# Patient Record
Sex: FEMALE | Race: WHITE
[De-identification: names, ages, dates, MRNs, and addresses within clinical notes are randomized per-mention and may not be internally consistent; named-entity substitution may affect disease eponyms.]

---

## 2021-02-17 ENCOUNTER — HOSPITAL ENCOUNTER (EMERGENCY)
Dept: HOSPITAL 47 - EC | Age: 26
Discharge: HOME | End: 2021-02-17
Payer: COMMERCIAL

## 2021-02-17 VITALS — HEART RATE: 93 BPM | DIASTOLIC BLOOD PRESSURE: 76 MMHG | SYSTOLIC BLOOD PRESSURE: 114 MMHG

## 2021-02-17 VITALS — RESPIRATION RATE: 16 BRPM | TEMPERATURE: 99.1 F

## 2021-02-17 DIAGNOSIS — Z88.0: ICD-10-CM

## 2021-02-17 DIAGNOSIS — Z88.8: ICD-10-CM

## 2021-02-17 DIAGNOSIS — T58.91XA: Primary | ICD-10-CM

## 2021-02-17 PROCEDURE — 82375 ASSAY CARBOXYHB QUANT: CPT

## 2021-02-17 PROCEDURE — 81025 URINE PREGNANCY TEST: CPT

## 2021-02-17 PROCEDURE — 99284 EMERGENCY DEPT VISIT MOD MDM: CPT

## 2021-02-17 NOTE — ED
General Adult HPI





- General


Chief complaint: Dizziness


Stated complaint: co2 poisoning


Time Seen by Provider: 02/17/21 16:57


Source: patient, EMS


Mode of arrival: EMS


Limitations: no limitations





- History of Present Illness


Initial comments: 


Tru a 25-year-old female who presents to the ER today by ambulance for 

evaluation of lightheadedness and concern for exposure to carbon monoxide.  

Patient reports that approximately half an hour before calling 911 she started 

her stove to cut she began to smell something atypical would lightheaded, she 

became concerned she may be exposed to carbon monoxide. EMS and fire arrived on 

scene, she was waiting outdoors on the porch,fire measured the carbon monoxide 

level in her residence as 160 and rising, the patient was awake alert and 

oriented and elected to be transported to the hospital for further evaluation 

per fire's recommendation.








- Related Data


                                    Allergies











Allergy/AdvReac Type Severity Reaction Status Date / Time


 


lamotrigine [From Lamictal] Allergy  Anaphylaxis Verified 02/17/21 17:06


 


Penicillins AdvReac  Nausea & Verified 02/17/21 17:06





   Vomiting  


 


prednisone AdvReac  Unknown Verified 02/17/21 17:06














Review of Systems


ROS Statement: 


Those systems with pertinent positive or pertinent negative responses have been 

documented in the HPI.





ROS Other: All systems not noted in ROS Statement are negative.





Past Medical History


Additional Past Medical History / Comment(s): syncope, gastroparesis


History of Any Multi-Drug Resistant Organisms: None Reported


Past Surgical History: Cholecystectomy


Past Psychological History: Anxiety, Depression


Past Alcohol Use History: Rare


Past Drug Use History: None Reported





General Exam





- General Exam Comments


Initial Comments: 


Physical Exam


GENERAL:


Patient is well-developed and well-nourished.  


Patient is nontoxic and well-hydrated and is in no distress.





HENT:


Normocephalic, Atraumatic. 





EYES:


PERRL, EOMI





PULMONARY:


Unlabored respirations.  





CARDIOVASCULAR:


RRR


Warm and well perfused extremities





ABDOMEN:


Non-distended





SKIN:


No rashes or bruising 





: 


Deferred





NEUROLOGIC:


Alert and oriented


Normal speech


Normal gait





MUSCULOSKELETAL:


Moving all extremities with no apparent injury 





PSYCHIATRIC:


No SI/HI


Limitations: no limitations





Course


                                   Vital Signs











  02/17/21





  17:01


 


Temperature 99.1 F


 


Pulse Rate 102 H


 


Respiratory 16





Rate 


 


O2 Sat by Pulse 97





Oximetry 














Medical Decision Making





- Medical Decision Making


patient was seen and evaluated history is obtained from the patient and EMS


Patient reports she's began to feel lightheaded at home when exposed to carbon 

monoxide however is feeling better after receiving oxygen in route to the 

hospital


Carbon monoxide level 2.6


Patient received one hour of supplemental oxygen she remains asymptomatic and is

comfortable with plan for discharge home











- Lab Data


                                   Lab Results











  02/17/21 02/17/21 Range/Units





  17:03 17:03 


 


Carbon Monoxide, Quant  2.6   (<10.0)  %


 


Urine HCG, Qual   Not Detected  (Not Detectd)  














Disposition


Clinical Impression: 


 Carbon monoxide exposure





Disposition: HOME SELF-CARE


Condition: Stable


Instructions (If sedation given, give patient instructions):  Carbon Monoxide 

Poisoning (ED)


Is patient prescribed a controlled substance at d/c from ED?: No


Referrals: 


Saray Ponce MD [Primary Care Provider] - 1-2 days

## 2021-03-02 ENCOUNTER — HOSPITAL ENCOUNTER (EMERGENCY)
Dept: HOSPITAL 47 - EC | Age: 26
Discharge: HOME | End: 2021-03-02
Payer: COMMERCIAL

## 2021-03-02 VITALS — RESPIRATION RATE: 18 BRPM | DIASTOLIC BLOOD PRESSURE: 78 MMHG | HEART RATE: 90 BPM | SYSTOLIC BLOOD PRESSURE: 125 MMHG

## 2021-03-02 VITALS — TEMPERATURE: 98.6 F

## 2021-03-02 DIAGNOSIS — Z90.49: ICD-10-CM

## 2021-03-02 DIAGNOSIS — F32.9: ICD-10-CM

## 2021-03-02 DIAGNOSIS — N94.10: Primary | ICD-10-CM

## 2021-03-02 LAB
PH UR: 6 [PH] (ref 5–8)
SP GR UR: 1.02 (ref 1–1.03)
SQUAMOUS UR QL AUTO: 4 /HPF (ref 0–4)
UROBILINOGEN UR QL STRIP: <2 MG/DL (ref ?–2)
WBC # UR AUTO: 5 /HPF (ref 0–5)

## 2021-03-02 PROCEDURE — 87070 CULTURE OTHR SPECIMN AEROBIC: CPT

## 2021-03-02 PROCEDURE — 87591 N.GONORRHOEAE DNA AMP PROB: CPT

## 2021-03-02 PROCEDURE — 87491 CHLMYD TRACH DNA AMP PROBE: CPT

## 2021-03-02 PROCEDURE — 81001 URINALYSIS AUTO W/SCOPE: CPT

## 2021-03-02 PROCEDURE — 99283 EMERGENCY DEPT VISIT LOW MDM: CPT

## 2021-03-02 PROCEDURE — 81025 URINE PREGNANCY TEST: CPT

## 2021-03-02 PROCEDURE — 87808 TRICHOMONAS ASSAY W/OPTIC: CPT

## 2021-03-02 NOTE — ED
General Adult HPI





- General


Chief complaint: Vaginal Bleeding


Stated complaint: Vaginal pain


Time Seen by Provider: 03/02/21 14:09


Source: patient, RN notes reviewed


Mode of arrival: ambulatory


Limitations: no limitations





- History of Present Illness


Initial comments: 





25-year-old female with a past medical history of syncope, gastroparesis, 

borderline personality disorder presents to the emergency room for a chief 

complaint of vaginal pain.  Patient states that she had sexual intercourse with 

a new partner 2 weeks ago.  States that for the past week she has had pain in 

the vaginal canal.  States that it feels like razor blades.  States that it 

hurts worse when she urinates.  Patient denies any pubic or abdominal pain.  

Denies fevers or chills.  Patient states she does not have any concern for STDs.

 She denies any vaginal discharge or foul odor.patient states she has had this 

pain several times before the last for a few days after sexual usually resolves.

 This time is different because it is not resolving.  patient also concerned she

could be pregnant.  States she has an IUD and has not had a period for 2 years 

however she had a drain she was pregnant and her dreams are usually accurate.  

Patient has no other complaints at this time including shortness of breath, 

chest pain, abdominal pain, nausea or vomiting, headache, or visual changes.





- Related Data


                                    Allergies











Allergy/AdvReac Type Severity Reaction Status Date / Time


 


lamotrigine [From Lamictal] Allergy  Anaphylaxis Verified 03/02/21 14:09


 


Penicillins AdvReac  Nausea & Verified 03/02/21 14:09





   Vomiting  


 


prednisone AdvReac  Unknown Verified 03/02/21 14:09














Review of Systems


ROS Statement: 


Those systems with pertinent positive or pertinent negative responses have been 

documented in the HPI.





ROS Other: All systems not noted in ROS Statement are negative.





Past Medical History


Past Medical History: Syncope


Additional Past Medical History / Comment(s): gastroparesis, borderline 

personality disorder


History of Any Multi-Drug Resistant Organisms: None Reported


Past Surgical History: Cholecystectomy


Past Psychological History: Anxiety, Depression


Smoking Status: Never smoker


Past Alcohol Use History: Rare


Past Drug Use History: None Reported





General Exam


Limitations: no limitations


General appearance: alert, in no apparent distress


Head exam: Present: atraumatic, normocephalic, normal inspection


Eye exam: Present: normal appearance, PERRL, EOMI.  Absent: scleral icterus, 

conjunctival injection, periorbital swelling


ENT exam: Present: normal exam, mucous membranes moist


Neck exam: Present: normal inspection, full ROM


Respiratory exam: Present: normal lung sounds bilaterally.  Absent: respiratory 

distress, wheezes, rales, rhonchi, stridor


Cardiovascular Exam: Present: regular rate, normal rhythm, normal heart sounds. 

Absent: systolic murmur, diastolic murmur, rubs, gallop, clicks


GI/Abdominal exam: Present: soft, normal bowel sounds.  Absent: distended, 

tenderness, guarding, rebound, rigid


External exam: Present: normal external exam.  Absent: erythema, swelling, 

lesions, lacerations, ecchymosis


Speculum exam: Present: cervical discharge (minimal mucousy cervical  

discharge), other (IUD strings in place, mild pain with speculum insertion).  

Absent: erythema, vaginal discharge, vaginal bleeding, foreign body, tissue, 

laceration


By manual exam: Present: normal by manual exam.  Absent: cervical motion 

tenderness, adnexal tenderness, adnexal mass, uterine enlargement, uterine 

tenderness





Course


                                   Vital Signs











  03/02/21





  14:03


 


Temperature 98.6 F


 


Pulse Rate 103 H


 


Respiratory 20





Rate 


 


Blood Pressure 133/81


 


O2 Sat by Pulse 96





Oximetry 














Medical Decision Making





- Medical Decision Making





Vitals are stable.  HPI and physical exam as documented. Urinalysis 

unremarkable.  HCG is negative.  Trichomonas negative.  Patient does not want 

empiric treatment for STDs at this time because she is not concerned about she 

could have these.  I did discuss that we will not get results back for at least 

2 days which she prefers to wait.  At this time we will wait for culture results

and have her follow up with OB/GYN.  If symptoms worsen she will return to the 

emergency room for further evaluation.





- Lab Data


                                   Lab Results











  03/02/21 03/02/21 03/02/21 Range/Units





  14:46 14:46 14:48 


 


Urine Color  Yellow    


 


Urine Appearance  Cloudy H    (Clear)  


 


Urine pH  6.0    (5.0-8.0)  


 


Ur Specific Gravity  1.024    (1.001-1.035)  


 


Urine Protein  Negative    (Negative)  


 


Urine Glucose (UA)  Negative    (Negative)  


 


Urine Ketones  Negative    (Negative)  


 


Urine Blood  Negative    (Negative)  


 


Urine Nitrite  Negative    (Negative)  


 


Urine Bilirubin  Negative    (Negative)  


 


Urine Urobilinogen  <2.0    (<2.0)  mg/dL


 


Ur Leukocyte Esterase  Small H    (Negative)  


 


Urine WBC  5    (0-5)  /hpf


 


Ur Squamous Epith Cells  4    (0-4)  /hpf


 


Urine Bacteria  Occasional H    (None)  /hpf


 


Urine Mucus  Occasional H    (None)  /hpf


 


Urine HCG, Qual   Not Detected   (Not Detectd)  


 


Trichomonas Ag (Rapid)    Negative  (Negative)  














Disposition


Clinical Impression: 


 Dyspareunia





Disposition: HOME SELF-CARE


Condition: Good


Instructions (If sedation given, give patient instructions):  Pelvic Pain in 

Women (ED)


Additional Instructions: 


Please follow up on culture results.  If symptoms worsen, you develop fevers, or

any other concerns return immediately to the emergency room.  Otherwise try to 

follow up with OB/GYN.


Is patient prescribed a controlled substance at d/c from ED?: No


Referrals: 


Saray Ponce MD [Primary Care Provider] - 1-2 days


Time of Disposition: 15:53

## 2021-03-09 ENCOUNTER — HOSPITAL ENCOUNTER (EMERGENCY)
Dept: HOSPITAL 47 - EC | Age: 26
Discharge: HOME | End: 2021-03-09
Payer: COMMERCIAL

## 2021-03-09 VITALS
HEART RATE: 98 BPM | RESPIRATION RATE: 18 BRPM | TEMPERATURE: 98.2 F | SYSTOLIC BLOOD PRESSURE: 128 MMHG | DIASTOLIC BLOOD PRESSURE: 80 MMHG

## 2021-03-09 DIAGNOSIS — R55: ICD-10-CM
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
## 2021-03-09 DIAGNOSIS — Z90.49: ICD-10-CM
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
## 2021-03-09 DIAGNOSIS — M54.5: Primary | ICD-10-CM

## 2021-03-09 DIAGNOSIS — F32.9: ICD-10-CM

## 2021-03-09 PROCEDURE — 99284 EMERGENCY DEPT VISIT MOD MDM: CPT

## 2021-03-09 PROCEDURE — 71046 X-RAY EXAM CHEST 2 VIEWS: CPT

## 2021-03-09 PROCEDURE — 72100 X-RAY EXAM L-S SPINE 2/3 VWS: CPT

## 2021-03-09 PROCEDURE — 81025 URINE PREGNANCY TEST: CPT

## 2021-03-09 PROCEDURE — 93005 ELECTROCARDIOGRAM TRACING: CPT

## 2021-03-09 NOTE — XR
EXAMINATION TYPE: XR lumbar spine 2 or 3V

 

DATE OF EXAM: 3/9/2021

 

CLINICAL HISTORY: Fall injury with pain

 

TECHNIQUE: Frontal and lateral images of the lumbar spine are obtained.

 

COMPARISON: None

 

FINDINGS:  There are 5 lumbar type vertebral bodies identified.  The lumbar spine shows satisfactory 
alignment without evidence of acute fracture or dislocation. Vertebral body heights and disk space he
ights are within normal limits.  Metallic IUD partially imaged in the pelvis.

 

IMPRESSION:  No acute fracture or dislocation is seen in the lumbar spine.

## 2021-03-09 NOTE — XR
EXAMINATION TYPE: XR chest 2V

 

DATE OF EXAM: 3/9/2021

 

COMPARISON: NONE

 

HISTORY: Syncope and weakness.

 

TECHNIQUE:  Frontal and lateral views of the chest are obtained.

 

FINDINGS: Somewhat low lung volumes. There is no focal air space opacity, pleural effusion, or pneumo
thorax seen.  The cardiac silhouette size is within normal limits.   The osseous structures are intac
t. Cholecystectomy clips noted on lateral view.

 

IMPRESSION:  No acute cardiopulmonary process.

## 2021-03-09 NOTE — ED
Back Pain HPI





- General


Chief Complaint: Back Pain/Injury


Stated Complaint: Lower back pain


Time Seen by Provider: 03/09/21 16:16


Source: patient


Limitations: no limitations





- History of Present Illness


Initial Comments: 


24yo female presenting today for cc of fall in shower. patient states she 

believes she passed otu in the shouwer she states this has happened for years 

now and she states she has had "every test" and has been seen by cardiology. 

Patient states that she did not come in today for the syncopal episode. She 

states she remembers falling to her butt, denies head injury, headaches, 

dizziness, nausea, vomiting. She denies chest pain dyspnea now, during or prior 

to episode. Patient denies neck pain. She admits to low bck pain, denies 

extremity pain/injury> Denies lacerations/abrasion. Patient denies additional 

complaints.patient denies IV drug use, fevers, loss of bowel bladder control, 

urinary retention, weakness or sensation deficits of the lower extremity.  

Patient denies radiation of the pain down the legs. UPon arrival she appears 

well nontoxic in no acute distress. denies pregnancy.








- Related Data


                                    Allergies











Allergy/AdvReac Type Severity Reaction Status Date / Time


 


lamotrigine [From Lamictal] Allergy  Anaphylaxis Verified 03/09/21 16:00


 


Penicillins AdvReac  Nausea & Verified 03/09/21 16:00





   Vomiting  


 


prednisone AdvReac  Unknown Verified 03/09/21 16:00














Review of Systems


ROS Statement: 


Those systems with pertinent positive or pertinent negative responses have been 

documented in the HPI.





ROS Other: All systems not noted in ROS Statement are negative.





Past Medical History


Past Medical History: Syncope


Additional Past Medical History / Comment(s): gastroparesis, borderline 

personality disorder,


History of Any Multi-Drug Resistant Organisms: None Reported


Past Surgical History: Cholecystectomy


Past Psychological History: Anxiety, Depression


Smoking Status: Never smoker


Past Alcohol Use History: Rare


Past Drug Use History: None Reported





General Exam





- General Exam Comments


Initial Comments: 


General:  The patient is awake and alert, in no distress


Eye:  +3 mm pupils are equal, round and reactive to light, extra-ocular 

movements are intact.  No nystagmus.  There is normal conjunctiva bilaterally.  

No signs of icterus.  


Ears, nose, mouth and throat:  There are moist mucous membranes and no oral 

lesions. 


Neck:  The neck is supple, there is no tenderness or JVD.  


Cardiovascular:  There is a regular rate and rhythm. No murmur, rub or gallop is

appreciated.


Respiratory:  Lungs are clear to auscultation, respirations are non-labored, 

breath sounds are equal.  No wheezes, stridor, rales, or rhonchi.


Gastrointestinal:  Soft, non-distended, non-tender abdomen without masses or 

organomegaly noted. There is no rebound or guarding present. 


Musculoskeletal: inspection of the cervical thoracic and lumbar spine.  No 

cervical or thoracic tenderness patient has right-sided paraspinal tenderness no

midline tenderness.  Negative straight leg raise.  Full strength of the upper or

lower extremities equal comparison bilaterally.  Sensation including the saddle 

region of the lower extremity bilaterally.  She can heel and toe walk.  No mild 

clonus or fasciculations. Radial and DP pulses equal bilaterally 2+.  


Neurological:  A&O x 3. CN II-XII intact grossly, There are no obvious motor or 

sensory deficits. Coordination appears grossly intact. Speech is normal.


Skin:  Skin is warm and dry and no rashes or lesions are noted. 


Psychiatric:  Cooperative, appropriate mood & affect, normal judgment.  





Limitations: no limitations





Course


                                   Vital Signs











  03/09/21





  15:58


 


Temperature 98.2 F


 


Pulse Rate 98


 


Respiratory 18





Rate 


 


Blood Pressure 128/80


 


O2 Sat by Pulse 95





Oximetry 














Medical Decision Making





- Medical Decision Making


25-year-old female presenting today for chief complaint of low back pain after 

fall.  Patient does have history of syncope.  She states that she's been 

evaluated thoroughly outpatient.  Patient states that was not her concern today 

is this is not unusual she denies any chest pain shortness of breath.  Patient 

states she no should not hit her head.  Patient's EKG no specific findings.  

Patient chest x-ray clear.no murmur no extremity findings.  Patient is 

paraspinal tenderness.  X-rays of lumbar spine are negative for acute osseous 

process patient has noticed findings concerning for vascular compromise.  

Patient has no neurological deficits.  No findings concerning for cauda equina 

at this time feel she is stable for discharge with outpatient primary care 

follow-up and symptomatically treatment





I discussed the case with Dr. Romo was agreeable to this care plan discharge at

this time








- Lab Data


                                   Lab Results











  03/09/21 Range/Units





  17:24 


 


Urine HCG, Qual  Not Detected  (Not Detectd)  














Disposition


Clinical Impression: 


 Low back pain, Fall, Syncope





Disposition: HOME SELF-CARE


Condition: Good


Instructions (If sedation given, give patient instructions):  Acute Low Back 

Pain (ED)


Additional Instructions: 


Please use medication as discussed.  Please follow-up with family doctor in the 

next 2 days.  Please return to emergency room if the symptoms increase or worsen

or for any other concerns.


Is patient prescribed a controlled substance at d/c from ED?: No


Referrals: 


Saray Ponce MD [Primary Care Provider] - 1-2 days


Time of Disposition: 17:40

## 2021-03-10 ENCOUNTER — HOSPITAL ENCOUNTER (EMERGENCY)
Dept: HOSPITAL 47 - EC | Age: 26
Discharge: HOME | End: 2021-03-10
Payer: COMMERCIAL

## 2021-03-10 VITALS — SYSTOLIC BLOOD PRESSURE: 122 MMHG | DIASTOLIC BLOOD PRESSURE: 78 MMHG | TEMPERATURE: 98.2 F | HEART RATE: 77 BPM

## 2021-03-10 VITALS — RESPIRATION RATE: 18 BRPM

## 2021-03-10 DIAGNOSIS — Z90.49: ICD-10-CM

## 2021-03-10 DIAGNOSIS — M25.512: ICD-10-CM

## 2021-03-10 DIAGNOSIS — R55: Primary | ICD-10-CM

## 2021-03-10 DIAGNOSIS — Z88.0: ICD-10-CM

## 2021-03-10 DIAGNOSIS — Z88.8: ICD-10-CM

## 2021-03-10 LAB
ALBUMIN SERPL-MCNC: 3.8 G/DL (ref 3.5–5)
ALP SERPL-CCNC: 99 U/L (ref 38–126)
ALT SERPL-CCNC: 27 U/L (ref 4–34)
ANION GAP SERPL CALC-SCNC: 7 MMOL/L
APTT BLD: 23.9 SEC (ref 22–30)
AST SERPL-CCNC: 29 U/L (ref 14–36)
BASOPHILS # BLD AUTO: 0.1 K/UL (ref 0–0.2)
BASOPHILS NFR BLD AUTO: 1 %
BUN SERPL-SCNC: 15 MG/DL (ref 7–17)
CALCIUM SPEC-MCNC: 9.2 MG/DL (ref 8.4–10.2)
CHLORIDE SERPL-SCNC: 106 MMOL/L (ref 98–107)
CO2 SERPL-SCNC: 26 MMOL/L (ref 22–30)
EOSINOPHIL # BLD AUTO: 0.4 K/UL (ref 0–0.7)
EOSINOPHIL NFR BLD AUTO: 5 %
ERYTHROCYTE [DISTWIDTH] IN BLOOD BY AUTOMATED COUNT: 4.53 M/UL (ref 3.8–5.4)
ERYTHROCYTE [DISTWIDTH] IN BLOOD: 12.9 % (ref 11.5–15.5)
GLUCOSE SERPL-MCNC: 91 MG/DL (ref 74–99)
HCT VFR BLD AUTO: 37.7 % (ref 34–46)
HGB BLD-MCNC: 13.1 GM/DL (ref 11.4–16)
INR PPP: 1 (ref ?–1.2)
LYMPHOCYTES # SPEC AUTO: 2 K/UL (ref 1–4.8)
LYMPHOCYTES NFR SPEC AUTO: 28 %
MCH RBC QN AUTO: 28.9 PG (ref 25–35)
MCHC RBC AUTO-ENTMCNC: 34.7 G/DL (ref 31–37)
MCV RBC AUTO: 83.3 FL (ref 80–100)
MONOCYTES # BLD AUTO: 0.3 K/UL (ref 0–1)
MONOCYTES NFR BLD AUTO: 5 %
NEUTROPHILS # BLD AUTO: 4.3 K/UL (ref 1.3–7.7)
NEUTROPHILS NFR BLD AUTO: 60 %
PH UR: 6 [PH] (ref 5–8)
PLATELET # BLD AUTO: 271 K/UL (ref 150–450)
POTASSIUM SERPL-SCNC: 3.9 MMOL/L (ref 3.5–5.1)
PROT SERPL-MCNC: 6.6 G/DL (ref 6.3–8.2)
PT BLD: 10.3 SEC (ref 9–12)
RBC UR QL: 1 /HPF (ref 0–5)
SODIUM SERPL-SCNC: 139 MMOL/L (ref 137–145)
SP GR UR: 1.03 (ref 1–1.03)
SQUAMOUS UR QL AUTO: 3 /HPF (ref 0–4)
UROBILINOGEN UR QL STRIP: <2 MG/DL (ref ?–2)
WBC # BLD AUTO: 7.2 K/UL (ref 3.8–10.6)
WBC #/AREA URNS HPF: 2 /HPF (ref 0–5)

## 2021-03-10 PROCEDURE — 84484 ASSAY OF TROPONIN QUANT: CPT

## 2021-03-10 PROCEDURE — 96360 HYDRATION IV INFUSION INIT: CPT

## 2021-03-10 PROCEDURE — 80053 COMPREHEN METABOLIC PANEL: CPT

## 2021-03-10 PROCEDURE — 85610 PROTHROMBIN TIME: CPT

## 2021-03-10 PROCEDURE — 85730 THROMBOPLASTIN TIME PARTIAL: CPT

## 2021-03-10 PROCEDURE — 85025 COMPLETE CBC W/AUTO DIFF WBC: CPT

## 2021-03-10 PROCEDURE — 99284 EMERGENCY DEPT VISIT MOD MDM: CPT

## 2021-03-10 PROCEDURE — 36415 COLL VENOUS BLD VENIPUNCTURE: CPT

## 2021-03-10 PROCEDURE — 81025 URINE PREGNANCY TEST: CPT

## 2021-03-10 PROCEDURE — 71046 X-RAY EXAM CHEST 2 VIEWS: CPT

## 2021-03-10 PROCEDURE — 81001 URINALYSIS AUTO W/SCOPE: CPT

## 2021-03-10 PROCEDURE — 93005 ELECTROCARDIOGRAM TRACING: CPT

## 2021-03-10 PROCEDURE — 70450 CT HEAD/BRAIN W/O DYE: CPT

## 2021-03-10 NOTE — XR
EXAMINATION TYPE: XR chest 2V

 

DATE OF EXAM: 3/10/2021

 

COMPARISON: Chest x-ray from yesterday.

 

HISTORY: Syncope and weakness.

 

TECHNIQUE:  Frontal and lateral views of the chest are obtained.

 

FINDINGS: Somewhat low lung volumes are redemonstrated. There is no suspicious new focal air space op
acity, pleural effusion, or pneumothorax seen bilaterally.  The cardiac silhouette size is stable and
 upper limits of normal.   Overlying EKG leads noted on current study. The osseous structures are int
act. Cholecystectomy clips redemonstrated on lateral view.

 

IMPRESSION:  Low lung volumes without acute pulmonary process.

## 2021-03-10 NOTE — ED
General Adult HPI





- General


Chief complaint: Syncope


Stated complaint: Dizziness, Passed out


Time Seen by Provider: 03/10/21 15:14


Source: patient, RN notes reviewed


Mode of arrival: wheelchair


Limitations: no limitations





- History of Present Illness


Initial comments: 





Patient is a 25-year-old female presents to emergency department status post 

syncopal episode.  She noted that she has a history of this for about the past 6

years.  She did note that when she fainted she had a upon arrival hitting her 

right shoulder and her head.  She notes that she does have some shoulder pain 

currently that is moderate.  She she did note that she does follow-up with 

referral to see her primary care regularly.  She states that she only drank 3 

glasses of water this morning.  She was in no apparent distress or pain while 

sitting in exam.  She denied any chest pain shortness of breath headache nausea 

vomiting diarrhea constipation fever fatigue chills weakness numbness tingling 

decreased range of motion or strength in any extremity.





- Related Data


                                    Allergies











Allergy/AdvReac Type Severity Reaction Status Date / Time


 


lamotrigine [From Lamictal] Allergy  Anaphylaxis Verified 03/10/21 14:32


 


Penicillins AdvReac  Nausea & Verified 03/10/21 14:32





   Vomiting  


 


prednisone AdvReac  Unknown Verified 03/10/21 14:32














Review of Systems


ROS Statement: 


Those systems with pertinent positive or pertinent negative responses have been 

documented in the HPI.





ROS Other: All systems not noted in ROS Statement are negative.





Past Medical History


Past Medical History: Syncope


Additional Past Medical History / Comment(s): gastroparesis, borderline 

personality disorder,


History of Any Multi-Drug Resistant Organisms: None Reported


Past Surgical History: Cholecystectomy


Past Psychological History: Anxiety, Depression


Smoking Status: Never smoker


Past Alcohol Use History: Rare


Past Drug Use History: None Reported





General Exam


Limitations: no limitations


General appearance: alert, in no apparent distress, obese


Head exam: Present: atraumatic, normocephalic, normal inspection


Eye exam: Present: normal appearance, PERRL, EOMI.  Absent: scleral icterus, 

conjunctival injection, periorbital swelling


ENT exam: Present: normal exam, mucous membranes moist


Neck exam: Present: normal inspection.  Absent: tenderness, meningismus, 

lymphadenopathy


Respiratory exam: Present: normal lung sounds bilaterally.  Absent: respiratory 

distress, wheezes, rales, rhonchi, stridor


Cardiovascular Exam: Present: regular rate, normal rhythm, normal heart sounds. 

Absent: systolic murmur, diastolic murmur, rubs, gallop, clicks


GI/Abdominal exam: Present: soft, normal bowel sounds.  Absent: distended, 

tenderness, guarding, rebound, rigid


Extremities exam: Present: normal inspection, full ROM, normal capillary refill.

 Absent: tenderness, pedal edema, joint swelling, calf tenderness


Neurological exam: Present: alert, oriented X3, CN II-XII intact


Psychiatric exam: Present: normal affect, normal mood


Skin exam: Present: warm, dry, intact, normal color.  Absent: rash





Course


                                   Vital Signs











  03/10/21 03/10/21





  14:30 14:32


 


Temperature  98.3 F


 


Pulse Rate  114 H


 


Respiratory 16 18





Rate  


 


Blood Pressure  115/77


 


O2 Sat by Pulse  96





Oximetry  














EKG Findings





- EKG Comments:


EKG Findings:: Ventricular rate 102 bpm, CT interval on 148 ms, QRS duration 92 

ms, QT/QTc 348/452 ms, PareT axes 55/18/5.  Sinus tachycardia, nonspecific ST 

abnormality, abnormal ECG.





Medical Decision Making





- Medical Decision Making





25-year-old female status post syncopal episode with injury to her right 

shoulder and hitting her head.


Chest x-ray, right upper shoulder x-ray, CT of the brain, labs, EKG, cardiac 

monitor, orthostatic blood pressure, 1 L normal saline ordered.


Labs unremarkable.


Radiology studies negative for any fracture or abnormalities.


Case discussed with Dr. Millan, decided patient could discharge home with 

conservative management and close follow-up with neurology and primary care.





- Lab Data


Result diagrams: 


                                 03/10/21 15:36





                                 03/10/21 15:36


                                   Lab Results











  03/10/21 03/10/21 03/10/21 Range/Units





  15:36 15:36 15:36 


 


WBC  7.2    (3.8-10.6)  k/uL


 


RBC  4.53    (3.80-5.40)  m/uL


 


Hgb  13.1    (11.4-16.0)  gm/dL


 


Hct  37.7    (34.0-46.0)  %


 


MCV  83.3    (80.0-100.0)  fL


 


MCH  28.9    (25.0-35.0)  pg


 


MCHC  34.7    (31.0-37.0)  g/dL


 


RDW  12.9    (11.5-15.5)  %


 


Plt Count  271    (150-450)  k/uL


 


MPV  6.6    


 


Neutrophils %  60    %


 


Lymphocytes %  28    %


 


Monocytes %  5    %


 


Eosinophils %  5    %


 


Basophils %  1    %


 


Neutrophils #  4.3    (1.3-7.7)  k/uL


 


Lymphocytes #  2.0    (1.0-4.8)  k/uL


 


Monocytes #  0.3    (0-1.0)  k/uL


 


Eosinophils #  0.4    (0-0.7)  k/uL


 


Basophils #  0.1    (0-0.2)  k/uL


 


PT   10.3   (9.0-12.0)  sec


 


INR   1.0   (<1.2)  


 


APTT   23.9   (22.0-30.0)  sec


 


Sodium    139  (137-145)  mmol/L


 


Potassium    3.9  (3.5-5.1)  mmol/L


 


Chloride    106  ()  mmol/L


 


Carbon Dioxide    26  (22-30)  mmol/L


 


Anion Gap    7  mmol/L


 


BUN    15  (7-17)  mg/dL


 


Creatinine    0.89  (0.52-1.04)  mg/dL


 


Est GFR (CKD-EPI)AfAm    >90  (>60 ml/min/1.73 sqM)  


 


Est GFR (CKD-EPI)NonAf    >90  (>60 ml/min/1.73 sqM)  


 


Glucose    91  (74-99)  mg/dL


 


Calcium    9.2  (8.4-10.2)  mg/dL


 


Total Bilirubin    0.3  (0.2-1.3)  mg/dL


 


AST    29  (14-36)  U/L


 


ALT    27  (4-34)  U/L


 


Alkaline Phosphatase    99  ()  U/L


 


Troponin I     (0.000-0.034)  ng/mL


 


Total Protein    6.6  (6.3-8.2)  g/dL


 


Albumin    3.8  (3.5-5.0)  g/dL


 


Urine Color     


 


Urine Appearance     (Clear)  


 


Urine pH     (5.0-8.0)  


 


Ur Specific Gravity     (1.001-1.035)  


 


Urine Protein     (Negative)  


 


Urine Glucose (UA)     (Negative)  


 


Urine Ketones     (Negative)  


 


Urine Blood     (Negative)  


 


Urine Nitrite     (Negative)  


 


Urine Bilirubin     (Negative)  


 


Urine Urobilinogen     (<2.0)  mg/dL


 


Ur Leukocyte Esterase     (Negative)  


 


Urine RBC     (0-5)  /hpf


 


Urine WBC     (0-5)  /hpf


 


Ur Squamous Epith Cells     (0-4)  /hpf


 


Urine Bacteria     (None)  /hpf


 


Urine Mucus     (None)  /hpf


 


Urine HCG, Qual     (Not Detectd)  














  03/10/21 03/10/21 03/10/21 Range/Units





  15:36 15:36 15:41 


 


WBC     (3.8-10.6)  k/uL


 


RBC     (3.80-5.40)  m/uL


 


Hgb     (11.4-16.0)  gm/dL


 


Hct     (34.0-46.0)  %


 


MCV     (80.0-100.0)  fL


 


MCH     (25.0-35.0)  pg


 


MCHC     (31.0-37.0)  g/dL


 


RDW     (11.5-15.5)  %


 


Plt Count     (150-450)  k/uL


 


MPV     


 


Neutrophils %     %


 


Lymphocytes %     %


 


Monocytes %     %


 


Eosinophils %     %


 


Basophils %     %


 


Neutrophils #     (1.3-7.7)  k/uL


 


Lymphocytes #     (1.0-4.8)  k/uL


 


Monocytes #     (0-1.0)  k/uL


 


Eosinophils #     (0-0.7)  k/uL


 


Basophils #     (0-0.2)  k/uL


 


PT     (9.0-12.0)  sec


 


INR     (<1.2)  


 


APTT     (22.0-30.0)  sec


 


Sodium     (137-145)  mmol/L


 


Potassium     (3.5-5.1)  mmol/L


 


Chloride     ()  mmol/L


 


Carbon Dioxide     (22-30)  mmol/L


 


Anion Gap     mmol/L


 


BUN     (7-17)  mg/dL


 


Creatinine     (0.52-1.04)  mg/dL


 


Est GFR (CKD-EPI)AfAm     (>60 ml/min/1.73 sqM)  


 


Est GFR (CKD-EPI)NonAf     (>60 ml/min/1.73 sqM)  


 


Glucose     (74-99)  mg/dL


 


Calcium     (8.4-10.2)  mg/dL


 


Total Bilirubin     (0.2-1.3)  mg/dL


 


AST     (14-36)  U/L


 


ALT     (4-34)  U/L


 


Alkaline Phosphatase     ()  U/L


 


Troponin I  <0.012    (0.000-0.034)  ng/mL


 


Total Protein     (6.3-8.2)  g/dL


 


Albumin     (3.5-5.0)  g/dL


 


Urine Color    Yellow  


 


Urine Appearance    Clear  (Clear)  


 


Urine pH    6.0  (5.0-8.0)  


 


Ur Specific Gravity    1.027  (1.001-1.035)  


 


Urine Protein    Negative  (Negative)  


 


Urine Glucose (UA)    Negative  (Negative)  


 


Urine Ketones    Negative  (Negative)  


 


Urine Blood    Negative  (Negative)  


 


Urine Nitrite    Negative  (Negative)  


 


Urine Bilirubin    Negative  (Negative)  


 


Urine Urobilinogen    <2.0  (<2.0)  mg/dL


 


Ur Leukocyte Esterase    Small H  (Negative)  


 


Urine RBC    1  (0-5)  /hpf


 


Urine WBC    2  (0-5)  /hpf


 


Ur Squamous Epith Cells    3  (0-4)  /hpf


 


Urine Bacteria    Rare H  (None)  /hpf


 


Urine Mucus    Few H  (None)  /hpf


 


Urine HCG, Qual   Not Detected   (Not Detectd)  














- EKG Data


-: EKG Interpreted by Me


EKG shows normal: sinus rhythm


Rate: tachycardia


EKG Comments: 





Ventricular rate 102 bpm, CT interval on 148 ms, QRS duration 92 ms, QT/QTc 

348/452 ms, PareT axes 55/18/5.


Sinus tachycardia, nonspecific ST abnormality, abnormal ECG.





- Radiology Data


Radiology results: report reviewed, image reviewed


Shoulder x-ray: There is no acute fracture dislocation of the right shoulder.


Chest x-ray: Low lung volumes without acute pulmonary process.


Brain CT: No acute intracranial hemorrhage or midline shift seen.





Disposition


Clinical Impression: 


 Syncope, Fall, Right shoulder pain





Disposition: HOME SELF-CARE


Condition: Stable


Instructions (If sedation given, give patient instructions):  Syncope (ED)


Additional Instructions: 


Please return to the Emergency Department if symptoms worsen or any other cecile

rns.


Follow-up primary care in 2-4 days, follow-up with neurology as well.


Increase oral fluid hydration due to possibly being dehydrated.





Is patient prescribed a controlled substance at d/c from ED?: No


Referrals: 


Saray Pnoce MD [Primary Care Provider] - 1-2 days


Time of Disposition: 16:58

## 2021-03-10 NOTE — CT
EXAMINATION TYPE: CT brain wo con

 

DATE OF EXAM: 3/10/2021

 

COMPARISON: None.

 

HISTORY: multiple syncopal episodes, hit head during most recent, headache, dizziness

 

CT DLP: 1094.4 mGycm.  Automated Exposure Control for Dose Reduction was Utilized.

 

 

TECHNIQUE: CT scan of the head is performed without contrast.

 

FINDINGS:   There is no acute intracranial hemorrhage, mass effect, or midline shift identified.  The
 ventricles and sulci are within normal limits in size.  The globes are intact and the visualized sin
uses are clear. The calvarium is intact.

 

IMPRESSION:  No acute intracranial hemorrhage or midline shift is seen.

## 2021-03-10 NOTE — XR
EXAMINATION TYPE: XR shoulder complete RT

 

DATE OF EXAM: 3/10/2021

 

CLINICAL HISTORY: Pain after fall injury.

 

TECHNIQUE:  Three views of the right shoulder are obtained.

 

COMPARISON: None. 

 

FINDINGS:  There is no acute fracture/dislocation evident in the right shoulder.  The acromioclavicul
ar and glenohumeral joint spaces appear within normal limits.  The visualized ribs are intact and unr
emarkable.

 

IMPRESSION:  There is no acute fracture or dislocation in the right shoulder.

## 2022-10-18 ENCOUNTER — HOSPITAL ENCOUNTER (OUTPATIENT)
Dept: HOSPITAL 47 - RADUSWWP | Age: 27
Discharge: HOME | End: 2022-10-18
Attending: FAMILY MEDICINE
Payer: COMMERCIAL

## 2022-10-18 DIAGNOSIS — N64.4: Primary | ICD-10-CM

## 2022-10-18 NOTE — USB
Reason for Exam: Clinical finding. 

Indicated Problems: 

Pain of the left side (Focal) for 2 Month(s) : Intermittent. Palpable abnormality of both sides.





Findings: 

The whole breast of both breasts and the axilla of both breasts were scanned.

A complete US of all four quadrants of the breast, axilla, retro-areolar region were reviewed.  No

solid or cystic masses are identified.. No axillary lymphadenopathy. Dense bands of tissue are

present on a background of fatty density. 





Overall Assessment: Negative, BI-RAD 1





Management: 

Screening Mammogram of both breasts at age 42.

If any persistent suspicious palpable abnormality develops, the patient can be rescanned. Otherwise,

routine annual screening mammograms at 42 years of age unless there is an indication to start

sooner. Patient should continue monthly self breast exams.??Also, further clinical management of left


breast pain.



Results were given to the patient verbally at the time of exam.



Electronically signed and approved by: Johana Castro M.D. Radiologist